# Patient Record
Sex: FEMALE | Race: WHITE | Employment: FULL TIME | URBAN - NONMETROPOLITAN AREA
[De-identification: names, ages, dates, MRNs, and addresses within clinical notes are randomized per-mention and may not be internally consistent; named-entity substitution may affect disease eponyms.]

---

## 2021-04-14 ENCOUNTER — OUTSIDE SERVICES (OUTPATIENT)
Dept: FAMILY MEDICINE CLINIC | Age: 66
End: 2021-04-14

## 2021-04-14 DIAGNOSIS — Z79.4 TYPE 2 DIABETES MELLITUS WITHOUT COMPLICATION, WITH LONG-TERM CURRENT USE OF INSULIN (HCC): ICD-10-CM

## 2021-04-14 DIAGNOSIS — E11.9 TYPE 2 DIABETES MELLITUS WITHOUT COMPLICATION, WITH LONG-TERM CURRENT USE OF INSULIN (HCC): ICD-10-CM

## 2021-04-14 DIAGNOSIS — G37.3 TRANSVERSE MYELITIS (HCC): Primary | ICD-10-CM

## 2021-04-14 DIAGNOSIS — E66.9 OBESITY, UNSPECIFIED CLASSIFICATION, UNSPECIFIED OBESITY TYPE, UNSPECIFIED WHETHER SERIOUS COMORBIDITY PRESENT: ICD-10-CM

## 2021-04-14 NOTE — PROGRESS NOTES
[address] [address]  [phone]  Dept: [phone]    BRANDI HOFF RMA, am scribing for and in the presence of Dr. Radha Gilmore. 4/14/21/12:30pm/SNP    Josafat Frost is a 61 y.o. female being seen for her initial H&P visit. Location of visit: [address] [place] [address].    HPI:  Admission History:  o 4/13/21 Pt admitted from [address] [place] [address] following a hospitalization for myelitis. She was sitting comfortably at home when she felt weakness in her hands and was dropping objects from R hand. Symptoms worsened over 30-45 minutes. she had extensive workup to evaluate for surgical infectious and metabolic etiologies  No causitive agent found   Presumed inflammaory Started on solumedrol 1,000 mg qd x 5 doses for transverse myelitis. She also had plasmaphoresis  Her symptoms seem to be improving slowly  She is admitted for expected prolonged rehab     New today:  N/A    ROS:  General Constitutional: Denies fever. Denies lightheadedness. Respiratory: Denies cough. Denies shortness of breath. Denies wheezing. Cardiovascular: Denies chest pain at rest.  Gastrointestinal: Denies abdominal pain. Denies blood in the stool. Denies constipation. Denies diarrhea. Denies nausea. Denies vomiting. Genitourinary: Denies blood in the urine. Denies painful urination. Skin: Denies rash. Neurologic: Denies falls. Denies dizziness. Xu Horobert Psychiatric: Denies sleep disturbance. Denies anxiety. Denies depressed mood. PHYSICAL EXAM:    General Appearance: in no acute distress, well nourished. Eyes: pupils equal, round reactive to light and accommodation. Oral Cavity: mucosa moist.  Neck/Thyroid:  no cervical lymphadenopathy, no thyromegaly  Skin: cool but not syanotic   Heart: regular rate and rhythm. No murmurs. S1, S2 normal, no gallops. Rate 70  Lungs: clear to auscultation bilaterally. Abdomen:soft, nontender, nondistended, no masses or organomegaly.  Round obese, soft, parikh cath  Extremities: no cyanosis, 1+ edema  Peripheral Pulses: 2+ throughout, symetric. Neurologic: verbally responsive, Can raise both arms, some functional use of R hand L hand non functional, minimal movement in legs, R foot reveals positive babinski sign L foot is neutral  Psych: normal affect, speech fluent. Frustrated and a bit irritable     ASSESSMENT:   Diagnosis Orders   1. Transverse myelitis ([address]     2. Type 2 diabetes mellitus without complication, with long-term current use of insulin (HCC)     3. Obesity, unspecified classification, unspecified obesity type, unspecified whether serious comorbidity present         PLAN:  61 y o dm woman w/ sudden onset of weakness of RUE and rapidly followed by LUE and bilat lower extremities and loss of bowel/bladder function. She was initially hospitalized at [facility] and then transferred to [facility], where she underwent extension neurologic testing to r/u myelopathy and CNS abnormalities or infectious etiology. She was given 5 days of IV steroid and a trial or plasma phoresis. Working dx of transvere myelitis and undetermined etiology. We discuss the uncommon nature and lack of response of usual treatments. She is to f/u with neurology in 2 months    She is here for a rehab stay    I will order a CBC, CMP, A1C, ESR and CRP. I, Dr. Sawyer Mayorga, personally performed the services described in this documentation as scribed by BRANDI Sosa in my presence, and is both accurate and complete.

## 2021-04-16 ENCOUNTER — OUTSIDE SERVICES (OUTPATIENT)
Dept: FAMILY MEDICINE CLINIC | Age: 66
End: 2021-04-16
Payer: COMMERCIAL

## 2021-04-16 DIAGNOSIS — Z79.4 TYPE 2 DIABETES MELLITUS WITHOUT COMPLICATION, WITH LONG-TERM CURRENT USE OF INSULIN (HCC): ICD-10-CM

## 2021-04-16 DIAGNOSIS — E66.09 OBESITY DUE TO EXCESS CALORIES WITH SERIOUS COMORBIDITY, UNSPECIFIED CLASSIFICATION: ICD-10-CM

## 2021-04-16 DIAGNOSIS — G37.3 TRANSVERSE MYELITIS (HCC): Primary | ICD-10-CM

## 2021-04-16 DIAGNOSIS — E11.9 TYPE 2 DIABETES MELLITUS WITHOUT COMPLICATION, WITH LONG-TERM CURRENT USE OF INSULIN (HCC): ICD-10-CM

## 2021-04-16 DIAGNOSIS — E66.9 OBESITY, UNSPECIFIED CLASSIFICATION, UNSPECIFIED OBESITY TYPE, UNSPECIFIED WHETHER SERIOUS COMORBIDITY PRESENT: ICD-10-CM

## 2021-04-16 DIAGNOSIS — M62.838 MUSCLE SPASM: ICD-10-CM

## 2021-04-16 PROCEDURE — 99309 SBSQ NF CARE MODERATE MDM 30: CPT | Performed by: FAMILY MEDICINE

## 2021-04-16 NOTE — PROGRESS NOTES
[address] [facility]  [place] [phone]  Dept: [phone]    BRANDI HOFF, JAELYN, am scribing for and in the presence of Dr. Bentley Zavaleta. 4/16/21/8:45am/SNP    Marbella Thornton is a 61 y.o. female being seen for her   follow up. Location of visit: [address] [place] [address].    HPI:  Admission History:  4/13/21 Pt admitted from [address] [place] [address] following a hospitalization for myelitis. She was sitting comfortably at home when she felt weakness in her hands and was dropping objects from R hand. Symptoms worsened over 30-45 minutes. she had extensive workup to evaluate for surgical infectious and metabolic etiologies  No causitive agent found   Presumed inflammaory Started on solumedrol 1,000 mg qd x 5 doses for transverse myelitis. She also had plasmaphoresis  Her symptoms seem to be improving slowly  She is admitted for expected prolonged rehab     Last visit:  61 y o dm woman w/ sudden onset of weakness of RUE and rapidly followed by LUE and bilat lower extremities and loss of bowel/bladder function. She was initially hospitalized at [address] [place] [address] and then transferred to [facility], where she underwent extension neurologic testing to r/u myelopathy and CNS abnormalities or infectious etiology. She was given 5 days of IV steroid and a trial or plasma phoresis.     Working dx of transvere myelitis and undetermined etiology. We discuss the uncommon nature and lack of response of usual treatments. She is to f/u with neurology in 2 months     She is here for a rehab stay     I will order a CBC, CMP, A1C, ESR and CRP. Faxes since last seen:  N/A    New today:  Nara Reed was upset because she was having a sore buttock and wanted flexeril and IBU but nurse didn't give it to her for several hours. Otherwise, no new problems.  Today we focus on the rarity of tranverse myelitis and once the treatable causes have been addressed rehabilitation is slow, thus the 2 month interval between follow up with neuro. ROS:  General Constitutional: Denies fever. Denies lightheadedness. Respiratory: Denies cough. Denies shortness of breath. Denies wheezing. Cardiovascular: Denies chest pain at rest.  Gastrointestinal: Denies abdominal pain. Denies blood in the stool. Denies constipation. Denies diarrhea. Denies nausea. Denies vomiting. Genitourinary: Denies blood in the urine. Denies painful urination. Skin:  Denies rash. Neurologic: Denies falls. Denies dizziness. Psychiatric: Denies sleep disturbance. Denies anxiety. Denies depressed mood. Admits sore buttock. PHYSICAL EXAM:    General Appearance: in no acute distress, well nourished. Eyes: pupils equal, round reactive to light and accommodation. Oral Cavity: mucosa moist.  Neck/Thyroid:  no cervical lymphadenopathy, no thyromegaly  Skin: warm and dry  Heart: regular rate and rhythm. No murmurs. S1, S2 normal, no gallops. Lungs: clear to auscultation bilaterally. Abdomen:soft, nontender, nondistended, no masses or organomegaly. Obese. Extremities: no cyanosis or edema. R hand can grasp more than left, feet minimal movement. : fol  Peripheral Pulses: 2+ throughout, symetric. Neurologic: verbally responsive, moves extremities. Psych: normal affect, speech fluent. ASSESSMENT:   Diagnosis Orders   1. Transverse myelitis ([facility]     2. Type 2 diabetes mellitus without complication, with long-term current use of insulin (HCC)     3. Obesity, unspecified classification, unspecified obesity type, unspecified whether serious comorbidity present     4. Obesity due to excess calories with serious comorbidity, unspecified classification     5. Muscle spasm         PLAN:  I will not make any changes, today. I, Dr. Anamaria Barrientos, personally performed the services described in this documentation as scribed by BRANDI Olvera in my presence, and is both accurate and complete.

## 2021-04-19 ENCOUNTER — OUTSIDE SERVICES (OUTPATIENT)
Dept: FAMILY MEDICINE CLINIC | Age: 66
End: 2021-04-19
Payer: COMMERCIAL

## 2021-04-19 DIAGNOSIS — L89.42 PRESSURE INJURY OF CONTIGUOUS REGION INVOLVING BUTTOCK AND HIP, STAGE 2, UNSPECIFIED LATERALITY (HCC): ICD-10-CM

## 2021-04-19 DIAGNOSIS — E66.9 OBESITY, UNSPECIFIED CLASSIFICATION, UNSPECIFIED OBESITY TYPE, UNSPECIFIED WHETHER SERIOUS COMORBIDITY PRESENT: ICD-10-CM

## 2021-04-19 DIAGNOSIS — G37.3 TRANSVERSE MYELITIS (HCC): Primary | ICD-10-CM

## 2021-04-19 DIAGNOSIS — M62.838 MUSCLE SPASM: ICD-10-CM

## 2021-04-19 DIAGNOSIS — Z79.4 TYPE 2 DIABETES MELLITUS WITHOUT COMPLICATION, WITH LONG-TERM CURRENT USE OF INSULIN (HCC): ICD-10-CM

## 2021-04-19 DIAGNOSIS — E11.9 TYPE 2 DIABETES MELLITUS WITHOUT COMPLICATION, WITH LONG-TERM CURRENT USE OF INSULIN (HCC): ICD-10-CM

## 2021-04-19 DIAGNOSIS — E66.09 OBESITY DUE TO EXCESS CALORIES WITH SERIOUS COMORBIDITY, UNSPECIFIED CLASSIFICATION: ICD-10-CM

## 2021-04-19 PROCEDURE — 99309 SBSQ NF CARE MODERATE MDM 30: CPT | Performed by: FAMILY MEDICINE

## 2021-04-19 NOTE — PROGRESS NOTES
[address] [address]  [place] [phone]  Dept: [phone]    Pavel HOFF RMA, am scribing for and in the presence of Dr. Frandy Cullen. 04/19/2021 4:34 pm ROMIE Mccarty is a 61 y.o. female being seen for her weekly follow up. Location of visit: [address] [place] [address].    HPI:  Admission History:  4/13/21 Pt admitted from [address] [place] [address] following a hospitalization for myelitis. She was sitting comfortably at home when she felt weakness in her hands and was dropping objects from R hand. Symptoms worsened over 30-45 minutes. she had extensive workup to evaluate for surgical infectious and metabolic etiologies  No causitive agent found   Presumed inflammaory Started on solumedrol 1,000 mg qd x 5 doses for transverse myelitis. She also had plasmaphoresis  Her symptoms seem to be improving slowly  She is admitted for expected prolonged rehab     Last visit:  I will not make any changes, today. Faxes since last seen:  o 4/16/21 Labs revd, no changes made. o 4/16/21 c/o constipation, start Miralax I cap qd     New today:  Open sores on buttocks    ROS:  General Constitutional: Denies fever. Denies lightheadedness. Respiratory: Denies cough. Denies shortness of breath. Denies wheezing. Cardiovascular: Denies chest pain at rest.  Gastrointestinal: Denies abdominal pain. Denies blood in the stool. Denies constipation. Denies diarrhea. Denies nausea. Denies vomiting. Genitourinary: Denies blood in the urine. Denies painful urination. Skin:  Denies rash. Neurologic: Denies falls. Denies dizziness. Psychiatric: Denies sleep disturbance. Denies anxiety. Denies depressed mood. PHYSICAL EXAM:    General Appearance: in no acute distress, well nourished. Eyes: pupils equal, round reactive to light and accommodation.   Oral Cavity: mucosa moist.  Neck/Thyroid:  no cervical lymphadenopathy, no thyromegaly  Skin: warm and dry, erythema along gluteal cleft and medial buttock of both thighs and ischial region on R side, partial thickness, no satellite lesions, scalded skin appearance  Heart: regular rate and rhythm. No murmurs. S1, S2 normal, no gallops. Rate 70  Lungs: clear to auscultation bilaterally. Abdomen:soft, nontender, nondistended, no masses or organomegaly. obese  Extremities: no cyanosis or edema. Peripheral Pulses: 2+ throughout, symetric. Neurologic: verbally responsive, moves extremities. Psych: normal affect, speech fluent. ASSESSMENT:   Diagnosis Orders   1. Transverse myelitis ([facility] [address]     2. Type 2 diabetes mellitus without complication, with long-term current use of insulin (HCC)     3. Obesity, unspecified classification, unspecified obesity type, unspecified whether serious comorbidity present     4. Obesity due to excess calories with serious comorbidity, unspecified classification     5. Muscle spasm         PLAN:  She has developed open sores on buttocks. She denies any associated pain. She has a sensation of BM or gas, she had a suppository for a BM 1 day ago but no sensation of defecation. Lehman cath in place. I will give orders for westcort cream to be applied to center chest bid for rash. I will also give order for wound consult for buttock wounds and side to side positioning. I, Dr. Ansley Henderson, personally performed the services described in this documentation as scribed by JAELYN De La Vega in my presence, and is both accurate and complete.

## 2021-04-23 ENCOUNTER — OUTSIDE SERVICES (OUTPATIENT)
Dept: FAMILY MEDICINE CLINIC | Age: 66
End: 2021-04-23
Payer: COMMERCIAL

## 2021-04-23 DIAGNOSIS — Z79.4 TYPE 2 DIABETES MELLITUS WITHOUT COMPLICATION, WITH LONG-TERM CURRENT USE OF INSULIN (HCC): ICD-10-CM

## 2021-04-23 DIAGNOSIS — E66.9 OBESITY, UNSPECIFIED CLASSIFICATION, UNSPECIFIED OBESITY TYPE, UNSPECIFIED WHETHER SERIOUS COMORBIDITY PRESENT: ICD-10-CM

## 2021-04-23 DIAGNOSIS — G37.3 TRANSVERSE MYELITIS (HCC): Primary | ICD-10-CM

## 2021-04-23 DIAGNOSIS — M62.838 MUSCLE SPASM: ICD-10-CM

## 2021-04-23 DIAGNOSIS — E11.9 TYPE 2 DIABETES MELLITUS WITHOUT COMPLICATION, WITH LONG-TERM CURRENT USE OF INSULIN (HCC): ICD-10-CM

## 2021-04-23 DIAGNOSIS — E66.09 OBESITY DUE TO EXCESS CALORIES WITH SERIOUS COMORBIDITY, UNSPECIFIED CLASSIFICATION: ICD-10-CM

## 2021-04-23 DIAGNOSIS — G82.50: ICD-10-CM

## 2021-04-23 DIAGNOSIS — L89.302 PRESSURE INJURY OF BUTTOCK, STAGE 2, UNSPECIFIED LATERALITY (HCC): ICD-10-CM

## 2021-04-23 PROCEDURE — 99309 SBSQ NF CARE MODERATE MDM 30: CPT | Performed by: FAMILY MEDICINE

## 2021-04-23 NOTE — PROGRESS NOTES
[address] [address]  [place] [address] [phone]  Dept: [phone]    BRANDI HOFF Counter, A, am scribing for and in the presence of Dr. Nancy Mills. 4/23/21/8:55am/SNP    Sarajane Prader is a 61 y.o. female being seen for her   follow up. Location of visit: [address] [place] [address].    HPI:  Admission History:  4/13/21 Pt admitted from [address] [place] [address] following a hospitalization for myelitis. She was sitting comfortably at home when she felt weakness in her hands and was dropping objects from R hand. Symptoms worsened over 30-45 minutes. she had extensive workup to evaluate for surgical infectious and metabolic etiologies  No causitive agent found   Presumed inflammaory Started on solumedrol 1,000 mg qd x 5 doses for transverse myelitis. She also had plasmaphoresis  Her symptoms seem to be improving slowly  She is admitted for expected prolonged rehab      Last visit:  She has developed open sores on buttocks. She denies any associated pain. She has a sensation of BM or gas, she had a suppository for a BM 1 day ago but no sensation of defecation. Lehman cath in place.      I will give orders for westcort cream to be applied to center chest bid for rash. I will also give order for wound consult for buttock wounds and side to side positioning.      Faxes since last seen:  N/A    New today:    ROS:  General Constitutional: Denies fever. Denies lightheadedness. Respiratory: Denies cough. Denies shortness of breath. Denies wheezing. Cardiovascular: Denies chest pain at rest.  Gastrointestinal: Denies abdominal pain. Denies blood in the stool. Denies constipation. Denies diarrhea. Denies nausea. Denies vomiting. Genitourinary: Denies blood in the urine. Denies painful urination. Skin:  Denies rash. Neurologic: Denies falls. Denies dizziness. Psychiatric: Denies sleep disturbance. Denies anxiety. Denies depressed mood.     PHYSICAL EXAM:    General Appearance: in no acute

## 2021-04-26 ENCOUNTER — OUTSIDE SERVICES (OUTPATIENT)
Dept: FAMILY MEDICINE CLINIC | Age: 66
End: 2021-04-26
Payer: COMMERCIAL

## 2021-04-26 DIAGNOSIS — E66.9 OBESITY, UNSPECIFIED CLASSIFICATION, UNSPECIFIED OBESITY TYPE, UNSPECIFIED WHETHER SERIOUS COMORBIDITY PRESENT: ICD-10-CM

## 2021-04-26 DIAGNOSIS — L89.42 PRESSURE INJURY OF CONTIGUOUS REGION INVOLVING BUTTOCK AND HIP, STAGE 2, UNSPECIFIED LATERALITY (HCC): ICD-10-CM

## 2021-04-26 DIAGNOSIS — L89.93 PRESSURE INJURY, STAGE 3, UNSPECIFIED LOCATION (HCC): ICD-10-CM

## 2021-04-26 DIAGNOSIS — E66.09 OBESITY DUE TO EXCESS CALORIES WITH SERIOUS COMORBIDITY, UNSPECIFIED CLASSIFICATION: ICD-10-CM

## 2021-04-26 DIAGNOSIS — G37.3 TRANSVERSE MYELITIS (HCC): Primary | ICD-10-CM

## 2021-04-26 DIAGNOSIS — E11.9 TYPE 2 DIABETES MELLITUS WITHOUT COMPLICATION, WITH LONG-TERM CURRENT USE OF INSULIN (HCC): ICD-10-CM

## 2021-04-26 DIAGNOSIS — M62.838 MUSCLE SPASM: ICD-10-CM

## 2021-04-26 DIAGNOSIS — Z79.4 TYPE 2 DIABETES MELLITUS WITHOUT COMPLICATION, WITH LONG-TERM CURRENT USE OF INSULIN (HCC): ICD-10-CM

## 2021-04-26 PROCEDURE — 99309 SBSQ NF CARE MODERATE MDM 30: CPT | Performed by: FAMILY MEDICINE

## 2021-04-26 NOTE — PROGRESS NOTES
[address] [address]  [place] [address]  Dept: [phone]    Lou HOFF RMA, am scribing for and in the presence of Dr. Foreign Torres. 04/26/2021 10:27 am ROMIE Mckinney is a 61 y.o. female being seen for her weekly follow up. Location of visit: [address] [place] [address].    HPI:  Admission History:  4/13/21 Pt admitted from [facility] following a hospitalization for myelitis. She was sitting comfortably at home when she felt weakness in her hands and was dropping objects from R hand. Symptoms worsened over 30-45 minutes. she had extensive workup to evaluate for surgical infectious and metabolic etiologies  No causitive agent found   Presumed inflammaory Started on solumedrol 1,000 mg qd x 5 doses for transverse myelitis. She also had plasmaphoresis  Her symptoms seem to be improving slowly  She is admitted for expected prolonged rehab     Last visit:  She has developed decubitus skin break down which is being evaluated by wound care, topical applications are being made. She is seeing movement in L hand, more than she had.     Turn her on her hip at 90 degrees 5 minutes hourly alternating, rest of hour 45 degrees with pillow support R or L her desire. Labs order, CBC, CMP and prealbumin and start her on zinc gluconate. Faxes since last seen:  o 4/23/21 Zinc started, change to 220 mg po qd. New today:  Orders written for positioning were not adhered to the duration of the weekend due to staffing. She is getting more UE mobility and functionality. She has no cardiac or resp. Sx. She has a BM with dulcolax supp. ROS:  General Constitutional: Denies fever. Denies lightheadedness. Respiratory: Denies cough. Denies shortness of breath. Denies wheezing. Cardiovascular: Denies chest pain at rest.  Gastrointestinal: Denies abdominal pain. Denies blood in the stool. Denies constipation. Denies diarrhea. Denies nausea. Denies vomiting. Genitourinary: Denies blood in the urine. Denies painful urination. Skin:  Denies rash. Neurologic: Denies falls. Denies dizziness. Psychiatric: Denies sleep disturbance. Denies anxiety. Denies depressed mood. PHYSICAL EXAM:    General Appearance: in no acute distress, well nourished. Eyes: pupils equal, round reactive to light and accommodation. Oral Cavity: mucosa moist.  Neck/Thyroid:  no cervical lymphadenopathy, no thyromegaly  Skin: warm and dry stage II ulceration on buttocks improved, less erythema, stage III ulceration at sacrum 3 cm x 2 cm appears filled with eschar and debris. Heart: regular rate and rhythm. No murmurs. S1, S2 normal, no gallops. Lungs: clear to auscultation bilaterally. Abdomen:soft, nontender, nondistended, no masses or organomegaly. Extremities: no cyanosis or edema. Peripheral Pulses: 2+ throughout, symetric. Neurologic: verbally responsive, moves extremities. Psych: normal affect, speech fluent. ASSESSMENT:   Diagnosis Orders   1. Transverse myelitis ([address]     2. Type 2 diabetes mellitus without complication, with long-term current use of insulin ([facility])     3. Obesity, unspecified classification, unspecified obesity type, unspecified whether serious comorbidity present     4. Obesity due to excess calories with serious comorbidity, unspecified classification     5. Muscle spasm     6. Pressure injury, stage 3, unspecified location ([address]     7. Pressure injury of contiguous region involving buttock and hip, stage 2, unspecified laterality ([address]         PLAN:  I reiterate that she needs to assist with positioning to take pressure off of the sacral region. Wound specialist will see her today. I will give orders for Dulcolax suppository 1 pr qd, as this may be needed daily to stimulate stool movement. I will also change heparin to 5000 units SC q12h.      I, Dr. Lisa Leon, personally performed the services described in this documentation as scribed by Chaka Figueroa

## 2021-04-28 ENCOUNTER — OUTSIDE SERVICES (OUTPATIENT)
Dept: FAMILY MEDICINE CLINIC | Age: 66
End: 2021-04-28
Payer: COMMERCIAL

## 2021-04-28 DIAGNOSIS — L89.302 PRESSURE INJURY OF BUTTOCK, STAGE 2, UNSPECIFIED LATERALITY (HCC): ICD-10-CM

## 2021-04-28 DIAGNOSIS — M62.838 MUSCLE SPASM: ICD-10-CM

## 2021-04-28 DIAGNOSIS — Z79.4 TYPE 2 DIABETES MELLITUS WITHOUT COMPLICATION, WITH LONG-TERM CURRENT USE OF INSULIN (HCC): ICD-10-CM

## 2021-04-28 DIAGNOSIS — G37.3 TRANSVERSE MYELITIS (HCC): Primary | ICD-10-CM

## 2021-04-28 DIAGNOSIS — E66.9 OBESITY, UNSPECIFIED CLASSIFICATION, UNSPECIFIED OBESITY TYPE, UNSPECIFIED WHETHER SERIOUS COMORBIDITY PRESENT: ICD-10-CM

## 2021-04-28 DIAGNOSIS — E66.09 OBESITY DUE TO EXCESS CALORIES WITH SERIOUS COMORBIDITY, UNSPECIFIED CLASSIFICATION: ICD-10-CM

## 2021-04-28 DIAGNOSIS — E11.9 TYPE 2 DIABETES MELLITUS WITHOUT COMPLICATION, WITH LONG-TERM CURRENT USE OF INSULIN (HCC): ICD-10-CM

## 2021-04-28 DIAGNOSIS — L89.42 PRESSURE INJURY OF CONTIGUOUS REGION INVOLVING BUTTOCK AND HIP, STAGE 2, UNSPECIFIED LATERALITY (HCC): ICD-10-CM

## 2021-04-28 DIAGNOSIS — L89.93 PRESSURE INJURY, STAGE 3, UNSPECIFIED LOCATION (HCC): ICD-10-CM

## 2021-04-28 PROCEDURE — 99309 SBSQ NF CARE MODERATE MDM 30: CPT | Performed by: FAMILY MEDICINE

## 2021-04-28 NOTE — PROGRESS NOTES
[address] [address]  [place] [address]  Dept: [phone]    BRANDI HOFF RMA, am scribing for and in the presence of Dr. Xiong Seen. 4/28/21/9:58am/SNP    Lissy Cade is a 61 y.o. female being seen for her   follow up. Location of visit: [address] [place] [address].    HPI:  Admission History:  4/13/21 Pt admitted from [address] [place] [address] following a hospitalization for myelitis. She was sitting comfortably at home when she felt weakness in her hands and was dropping objects from R hand. Symptoms worsened over 30-45 minutes. she had extensive workup to evaluate for surgical infectious and metabolic etiologies  No causitive agent found   Presumed inflammaory Started on solumedrol 1,000 mg qd x 5 doses for transverse myelitis. She also had plasmaphoresis  Her symptoms seem to be improving slowly  She is admitted for expected prolonged rehab     Last visit:  I reiterate that she needs to assist with positioning to take pressure off of the sacral region. Wound specialist will see her today. I will give orders for Dulcolax suppository 1 pr qd, as this may be needed daily to stimulate stool movement. I will also change heparin to 5000 units SC q12h. Faxes since last seen:  o 4/26/21 R will remain at the facility and off of work indefinitely. o 4/27/21 Labs revd, no changes made. New today:  Shanell is getting some significant improvement with her UE/LE strength and movement. She has had difficulty with compliance with bed positioning due to staffing issues. She did have debridement by wound dr. Manuel Pugh. Appetite is good BS are well controlled. She is not using SS coverage. ROS:  General Constitutional: Denies fever. Denies lightheadedness. Respiratory: Denies cough. Denies shortness of breath. Denies wheezing. Cardiovascular: Denies chest pain at rest.  Gastrointestinal: Denies abdominal pain. Denies blood in the stool. Denies constipation. Denies diarrhea. Denies nausea. Denies vomiting. Genitourinary: Denies blood in the urine. Denies painful urination. Skin:  Denies rash. Neurologic: Denies falls. Denies dizziness. Psychiatric: Denies sleep disturbance. Denies anxiety. Denies depressed mood. PHYSICAL EXAM:    General Appearance: in no acute distress, well nourished. Able to move feet and raise legs again gravity and assist with bed positioning. Eyes: pupils equal, round reactive to light and accommodation. Oral Cavity: mucosa moist.  Neck/Thyroid:  no cervical lymphadenopathy, no thyromegaly  Skin: warm sacral decub. Ulcer, rather deep with dark center. She has another smaller ulceration full thickness at coccyx. Heart: regular rate and rhythm. No murmurs. S1, S2 normal, no gallops. Lungs: clear to auscultation bilaterally. Abdomen:soft, nontender, nondistended, no masses or organomegaly. Obese. Extremities: no cyanosis or edema. Peripheral Pulses: 2+ throughout, symetric. Neurologic: verbally responsive, moves extremities. Psych: normal affect, speech fluent. ASSESSMENT:   Diagnosis Orders   1. Transverse myelitis ([address]     2. Type 2 diabetes mellitus without complication, with long-term current use of insulin ([facility])     3. Obesity, unspecified classification, unspecified obesity type, unspecified whether serious comorbidity present     4. Obesity due to excess calories with serious comorbidity, unspecified classification     5. Muscle spasm     6. Pressure injury, stage 3, unspecified location ([address]     7. Pressure injury of contiguous region involving buttock and hip, stage 2, unspecified laterality ([address]     8. Pressure injury of buttock, stage 2, unspecified laterality ([address]         PLAN:  I will d/c fsbs sliding scale humalog. I also would like fsbs qam only  I would like them to be more diligent with bed positioning to keep pressure off of her ulcers.      I, Dr. Norm Christianson, personally performed the services described in this documentation as scribed by BRANDI Machado Hence in my presence, and is both accurate and complete.

## 2021-04-30 ENCOUNTER — OUTSIDE SERVICES (OUTPATIENT)
Dept: FAMILY MEDICINE CLINIC | Age: 66
End: 2021-04-30
Payer: COMMERCIAL

## 2021-04-30 DIAGNOSIS — E66.9 OBESITY, UNSPECIFIED CLASSIFICATION, UNSPECIFIED OBESITY TYPE, UNSPECIFIED WHETHER SERIOUS COMORBIDITY PRESENT: ICD-10-CM

## 2021-04-30 DIAGNOSIS — L89.42 PRESSURE INJURY OF CONTIGUOUS REGION INVOLVING BUTTOCK AND HIP, STAGE 2, UNSPECIFIED LATERALITY (HCC): ICD-10-CM

## 2021-04-30 DIAGNOSIS — G37.3 TRANSVERSE MYELITIS (HCC): Primary | ICD-10-CM

## 2021-04-30 DIAGNOSIS — L89.93 PRESSURE INJURY, STAGE 3, UNSPECIFIED LOCATION (HCC): ICD-10-CM

## 2021-04-30 DIAGNOSIS — E66.09 OBESITY DUE TO EXCESS CALORIES WITH SERIOUS COMORBIDITY, UNSPECIFIED CLASSIFICATION: ICD-10-CM

## 2021-04-30 DIAGNOSIS — L89.302 PRESSURE INJURY OF BUTTOCK, STAGE 2, UNSPECIFIED LATERALITY (HCC): ICD-10-CM

## 2021-04-30 DIAGNOSIS — E11.9 TYPE 2 DIABETES MELLITUS WITHOUT COMPLICATION, WITH LONG-TERM CURRENT USE OF INSULIN (HCC): ICD-10-CM

## 2021-04-30 DIAGNOSIS — M62.838 MUSCLE SPASM: ICD-10-CM

## 2021-04-30 DIAGNOSIS — Z79.4 TYPE 2 DIABETES MELLITUS WITHOUT COMPLICATION, WITH LONG-TERM CURRENT USE OF INSULIN (HCC): ICD-10-CM

## 2021-04-30 PROCEDURE — 99309 SBSQ NF CARE MODERATE MDM 30: CPT | Performed by: FAMILY MEDICINE

## 2021-04-30 NOTE — PROGRESS NOTES
[address] [address]  [place] [phone]  Dept: [phone]    IMatilda RMA, am scribing for and in the presence of Dr. Abimael Suarez. 04/30/2021 12:12 pm ROMIE Mccollum is a 61 y.o. female being seen for her skilled follow up. Location of visit: [address] [place] [address].    HPI:  Admission History:  4/13/21 Pt admitted from [address] [place] [address] following a hospitalization for myelitis. She was sitting comfortably at home when she felt weakness in her hands and was dropping objects from R hand. Symptoms worsened over 30-45 minutes. she had extensive workup to evaluate for surgical infectious and metabolic etiologies  No causitive agent found   Presumed inflammaory Started on solumedrol 1,000 mg qd x 5 doses for transverse myelitis. She also had plasmaphoresis  Her symptoms seem to be improving slowly  She is admitted for expected prolonged rehab     Last visit:  I will d/c fsbs sliding scale humalog. I also would like fsbs qam only  I would like them to be more diligent with bed positioning to keep pressure off of her ulcers. Faxes since last seen:  -none. New today:  Shanell is getting much better mobility of arms and legs, PT attempted to stand her at parallel bars, unable to accomplish, bed mobility improving, assist with rolling over. Buttock lesions improving with offloading instructions. Not waking at night as often. Regular BM yesterday but still can't sense passage. ROS:  General Constitutional: Denies fever. Denies lightheadedness. Respiratory: Denies cough. Denies shortness of breath. Denies wheezing. Cardiovascular: Denies chest pain at rest.  Gastrointestinal: Denies abdominal pain. Denies blood in the stool. Denies constipation. Denies diarrhea. Denies nausea. Denies vomiting. Genitourinary: Denies blood in the urine. Denies painful urination. Skin:  Denies rash. Neurologic: Denies falls. Denies dizziness.   Psychiatric: Denies

## 2021-05-03 ENCOUNTER — OUTSIDE SERVICES (OUTPATIENT)
Dept: FAMILY MEDICINE CLINIC | Age: 66
End: 2021-05-03
Payer: COMMERCIAL

## 2021-05-03 DIAGNOSIS — E66.09 OBESITY DUE TO EXCESS CALORIES WITH SERIOUS COMORBIDITY, UNSPECIFIED CLASSIFICATION: ICD-10-CM

## 2021-05-03 DIAGNOSIS — L89.302 PRESSURE INJURY OF BUTTOCK, STAGE 2, UNSPECIFIED LATERALITY (HCC): ICD-10-CM

## 2021-05-03 DIAGNOSIS — L89.42 PRESSURE INJURY OF CONTIGUOUS REGION INVOLVING BUTTOCK AND HIP, STAGE 2, UNSPECIFIED LATERALITY (HCC): ICD-10-CM

## 2021-05-03 DIAGNOSIS — L89.93 PRESSURE INJURY, STAGE 3, UNSPECIFIED LOCATION (HCC): ICD-10-CM

## 2021-05-03 DIAGNOSIS — E66.9 OBESITY, UNSPECIFIED CLASSIFICATION, UNSPECIFIED OBESITY TYPE, UNSPECIFIED WHETHER SERIOUS COMORBIDITY PRESENT: ICD-10-CM

## 2021-05-03 DIAGNOSIS — Z79.4 TYPE 2 DIABETES MELLITUS WITHOUT COMPLICATION, WITH LONG-TERM CURRENT USE OF INSULIN (HCC): ICD-10-CM

## 2021-05-03 DIAGNOSIS — E11.9 TYPE 2 DIABETES MELLITUS WITHOUT COMPLICATION, WITH LONG-TERM CURRENT USE OF INSULIN (HCC): ICD-10-CM

## 2021-05-03 DIAGNOSIS — M62.838 MUSCLE SPASM: ICD-10-CM

## 2021-05-03 DIAGNOSIS — G37.3 TRANSVERSE MYELITIS (HCC): Primary | ICD-10-CM

## 2021-05-03 PROCEDURE — 99309 SBSQ NF CARE MODERATE MDM 30: CPT | Performed by: FAMILY MEDICINE

## 2021-05-03 NOTE — PROGRESS NOTES
[address] [address]  [place] [phone]  Dept: [phone]    BRANDI HOFF RMA, am scribing for and in the presence of Dr. Ana Cabello. 5/3/21/8:52am/SNP    Martín Navarro is a 61 y.o. female being seen for her skilled follow up. Location of visit: [address] [place] [address].    HPI:  Admission History:  4/13/21 Pt admitted from [address] [place] [address] following a hospitalization for myelitis. She was sitting comfortably at home when she felt weakness in her hands and was dropping objects from R hand. Symptoms worsened over 30-45 minutes. she had extensive workup to evaluate for surgical infectious and metabolic etiologies  No causitive agent found   Presumed inflammaory Started on solumedrol 1,000 mg qd x 5 doses for transverse myelitis. She also had plasmaphoresis  Her symptoms seem to be improving slowly  She is admitted for expected prolonged rehab      Last visit:  I would like her to position herself on her side at night. I will not make any changes, today. Faxes since last seen:  N/A    New today:  Bed mobility continues to improve, limited manual dexterity,unable to operate bed controls, can assist with rolling over. Bowels are fine. She has had worsening of sacral decubitus with foul odor with the ulcer. ROS:  General Constitutional: Denies fever. Denies lightheadedness. Respiratory: Denies cough. Denies shortness of breath. Denies wheezing. Cardiovascular: Denies chest pain at rest.  Gastrointestinal: Denies abdominal pain. Denies blood in the stool. Denies constipation. Denies diarrhea. Denies nausea. Denies vomiting. Genitourinary: Denies blood in the urine. Denies painful urination. Skin:  Denies rash. Neurologic: Denies falls. Denies dizziness. Psychiatric: Denies sleep disturbance. Denies anxiety. Denies depressed mood. PHYSICAL EXAM:    General Appearance: in no acute distress, well nourished.   Eyes: pupils equal, round reactive to light and accommodation. Oral Cavity: mucosa moist.  Neck/Thyroid:  no cervical lymphadenopathy, no thyromegaly  Skin: warm and dry buttock area shows enlargement of sacral dec ulcer, combine into one sacral ulcer with dark eschar, buttocks stage II ulcerations nearly healed. No other breakdown. Heart: regular rate and rhythm. No murmurs. S1, S2 normal, no gallops. Lungs: clear to auscultation bilaterally. Abdomen:soft, nontender, nondistended, no masses or organomegaly. Extremities: no cyanosis or edema. Peripheral Pulses: 2+ throughout, symetric. Neurologic: verbally responsive, moves extremities. Psych: normal affect, speech fluent. ASSESSMENT:   Diagnosis Orders   1. Transverse myelitis ([address]     2. Type 2 diabetes mellitus without complication, with long-term current use of insulin ([facility])     3. Obesity, unspecified classification, unspecified obesity type, unspecified whether serious comorbidity present     4. Obesity due to excess calories with serious comorbidity, unspecified classification     5. Muscle spasm     6. Pressure injury, stage 3, unspecified location ([address]     7. Pressure injury of contiguous region involving buttock and hip, stage 2, unspecified laterality ([address]     8. Pressure injury of buttock, stage 2, unspecified laterality ([address]       PLAN:  Systemic abx are not indicated for ulcers like this. Anaerobic bacteria is suspected due to the odor. I will give orders for betadine wet-dry on sacral/ coccygeal ulcer bid. I, Dr. Leslie Harrison, personally performed the services described in this documentation as scribed by BRANDI Del Castillo in my presence, and is both accurate and complete.

## 2021-05-05 ENCOUNTER — OUTSIDE SERVICES (OUTPATIENT)
Dept: FAMILY MEDICINE CLINIC | Age: 66
End: 2021-05-05

## 2021-05-05 DIAGNOSIS — L89.302 PRESSURE INJURY OF BUTTOCK, STAGE 2, UNSPECIFIED LATERALITY (HCC): ICD-10-CM

## 2021-05-05 DIAGNOSIS — L89.42 PRESSURE INJURY OF CONTIGUOUS REGION INVOLVING BUTTOCK AND HIP, STAGE 2, UNSPECIFIED LATERALITY (HCC): ICD-10-CM

## 2021-05-05 DIAGNOSIS — L89.93 PRESSURE INJURY, STAGE 3, UNSPECIFIED LOCATION (HCC): ICD-10-CM

## 2021-05-05 DIAGNOSIS — G37.3 TRANSVERSE MYELITIS (HCC): Primary | ICD-10-CM

## 2021-05-05 DIAGNOSIS — E66.9 OBESITY, UNSPECIFIED CLASSIFICATION, UNSPECIFIED OBESITY TYPE, UNSPECIFIED WHETHER SERIOUS COMORBIDITY PRESENT: ICD-10-CM

## 2021-05-05 DIAGNOSIS — M62.838 MUSCLE SPASM: ICD-10-CM

## 2021-05-05 DIAGNOSIS — Z79.4 TYPE 2 DIABETES MELLITUS WITHOUT COMPLICATION, WITH LONG-TERM CURRENT USE OF INSULIN (HCC): ICD-10-CM

## 2021-05-05 DIAGNOSIS — E11.9 TYPE 2 DIABETES MELLITUS WITHOUT COMPLICATION, WITH LONG-TERM CURRENT USE OF INSULIN (HCC): ICD-10-CM

## 2021-05-05 DIAGNOSIS — E66.09 OBESITY DUE TO EXCESS CALORIES WITH SERIOUS COMORBIDITY, UNSPECIFIED CLASSIFICATION: ICD-10-CM

## 2021-05-05 NOTE — PROGRESS NOTES
[address] [address]  [phone]  Dept: [phone]    BRANDI HOFF RMA, am scribing for and in the presence of Dr. Ben Wells. 5/5/21/10:55/SNP    Santy Mccollum is a 61 y.o. female being seen for her skilled follow up. Location of visit: [address] [place] [address].    HPI:  Admission History:  4/13/21 Pt admitted from [address] [place] [address] following a hospitalization for myelitis. She was sitting comfortably at home when she felt weakness in her hands and was dropping objects from R hand. Symptoms worsened over 30-45 minutes. she had extensive workup to evaluate for surgical infectious and metabolic etiologies  No causitive agent found   Presumed inflammaory Started on solumedrol 1,000 mg qd x 5 doses for transverse myelitis. She also had plasmaphoresis  Her symptoms seem to be improving slowly  She is admitted for expected prolonged rehab     Last visit:  Systemic abx are not indicated for ulcers like this. Anaerobic bacteria is suspected due to the odor. I will give orders for betadine wet-dry on sacral/ coccygeal ulcer bid. Faxes since last seen:  o 5/3/21 ordered Tylenol 325mg 2 po q 4* prn  o 5/4/21 [employer] disability paperwork faxed to facility   New today:  Silvia Gillespie was seen by the surgeon yesterday. Wound will require more aggressive treatment, sending her to wound clinic since on heparin. Bed mobility improving trouble with dexterity and holding objects. Not able to stand but able to sit in chair. BMs regular    ROS:  General Constitutional: Denies fever. Denies lightheadedness. Respiratory: Denies cough. Denies shortness of breath. Denies wheezing. Cardiovascular: Denies chest pain at rest.  Gastrointestinal: Denies abdominal pain. Denies blood in the stool. Denies constipation. Denies diarrhea. Denies nausea. Denies vomiting. Genitourinary: Denies blood in the urine. Denies painful urination. Skin:  Denies rash. Neurologic: Denies falls.  Denies dizziness. Psychiatric: Denies sleep disturbance. Denies anxiety. Denies depressed mood. PHYSICAL EXAM:    General Appearance: in no acute distress, well nourished. Eyes: pupils equal, round reactive to light and accommodation. Oral Cavity: mucosa moist.  Neck/Thyroid:  no cervical lymphadenopathy, no thyromegaly  Skin: warm and dry wound not examined, today. Heart: regular rate and rhythm. No murmurs. S1, S2 normal, no gallops. Lungs: clear to auscultation bilaterally. Abdomen:soft, nontender, nondistended, no masses or organomegaly. Extremities: no cyanosis or edema. Peripheral Pulses: 2+ throughout, symetric. Neurologic: verbally responsive, moves extremities. Psych: normal affect, speech fluent. ASSESSMENT:   Diagnosis Orders   1. Transverse myelitis ([address]     2. Type 2 diabetes mellitus without complication, with long-term current use of insulin ([facility])     3. Obesity, unspecified classification, unspecified obesity type, unspecified whether serious comorbidity present     4. Obesity due to excess calories with serious comorbidity, unspecified classification     5. Muscle spasm     6. Pressure injury, stage 3, unspecified location ([address]     7. Pressure injury of contiguous region involving buttock and hip, stage 2, unspecified laterality ([address]     8. Pressure injury of buttock, stage 2, unspecified laterality ([address]         PLAN:  I will not make any changes, today. I, Dr. Mauro White, personally performed the services described in this documentation as scribed by JAELYN Adams in my presence, and is both accurate and complete.

## 2021-05-07 ENCOUNTER — OUTSIDE SERVICES (OUTPATIENT)
Dept: FAMILY MEDICINE CLINIC | Age: 66
End: 2021-05-07

## 2021-05-07 DIAGNOSIS — Z79.4 TYPE 2 DIABETES MELLITUS WITHOUT COMPLICATION, WITH LONG-TERM CURRENT USE OF INSULIN (HCC): ICD-10-CM

## 2021-05-07 DIAGNOSIS — L89.302 PRESSURE INJURY OF BUTTOCK, STAGE 2, UNSPECIFIED LATERALITY (HCC): ICD-10-CM

## 2021-05-07 DIAGNOSIS — E66.09 OBESITY DUE TO EXCESS CALORIES WITH SERIOUS COMORBIDITY, UNSPECIFIED CLASSIFICATION: ICD-10-CM

## 2021-05-07 DIAGNOSIS — G37.3 TRANSVERSE MYELITIS (HCC): Primary | ICD-10-CM

## 2021-05-07 DIAGNOSIS — L89.42 PRESSURE INJURY OF CONTIGUOUS REGION INVOLVING BUTTOCK AND HIP, STAGE 2, UNSPECIFIED LATERALITY (HCC): ICD-10-CM

## 2021-05-07 DIAGNOSIS — M62.838 MUSCLE SPASM: ICD-10-CM

## 2021-05-07 DIAGNOSIS — E11.9 TYPE 2 DIABETES MELLITUS WITHOUT COMPLICATION, WITH LONG-TERM CURRENT USE OF INSULIN (HCC): ICD-10-CM

## 2021-05-07 DIAGNOSIS — L89.93 PRESSURE INJURY, STAGE 3, UNSPECIFIED LOCATION (HCC): ICD-10-CM

## 2021-05-07 DIAGNOSIS — E66.9 OBESITY, UNSPECIFIED CLASSIFICATION, UNSPECIFIED OBESITY TYPE, UNSPECIFIED WHETHER SERIOUS COMORBIDITY PRESENT: ICD-10-CM

## 2021-05-07 NOTE — PROGRESS NOTES
wounds stage 2 wounds improved but still apparent on R lateral L inferior buttock. Heart: regular rate and rhythm. No murmurs. S1, S2 normal, no gallops. Lungs: clear to auscultation bilaterally. Abdomen:soft, nontender, nondistended, no masses or organomegaly. Extremities: no cyanosis or edema. Peripheral Pulses: 2+ throughout, symetric. Neurologic: verbally responsive, moves extremities. Psych: normal affect, speech fluent. ASSESSMENT:   Diagnosis Orders   1. Transverse myelitis ([address]     2. Type 2 diabetes mellitus without complication, with long-term current use of insulin ([facility])     3. Obesity, unspecified classification, unspecified obesity type, unspecified whether serious comorbidity present     4. Obesity due to excess calories with serious comorbidity, unspecified classification     5. Muscle spasm     6. Pressure injury, stage 3, unspecified location ([address]     7. Pressure injury of contiguous region involving buttock and hip, stage 2, unspecified laterality ([address]     8. Pressure injury of buttock, stage 2, unspecified laterality ([address]       PLAN:  She has improved function in hands and strength in leg. Sacral wound drainage copious amounts of fecal matter, anaerobic fecal smell, she had no fever. She is not able to sense BM, although can sense when she has one, which is qd. Given apparent rectal cutaneous drainage she was sent to hospital for surgical eval.     I, Dr. Connie Ramirez, personally performed the services described in this documentation as scribed by BRANDI Galeano in my presence, and is both accurate and complete.

## 2025-01-08 ENCOUNTER — HOSPITAL ENCOUNTER (OUTPATIENT)
Dept: PHYSICAL THERAPY | Age: 70
Setting detail: THERAPIES SERIES
Discharge: HOME | End: 2025-01-08
Payer: MEDICARE

## 2025-01-08 PROCEDURE — 97110 THERAPEUTIC EXERCISES: CPT

## 2025-01-08 PROCEDURE — 97161 PT EVAL LOW COMPLEX 20 MIN: CPT

## 2025-01-08 NOTE — PROGRESS NOTES
tissue  Hair Growth: Absent  Conditions to nail beds: Dystrophic left, Dystrophic right  Edema Rebound: Fibrotic Tissue    Signs of Constriction (if applicable):   Skin Breakdown: Yes  Skin Breakdown Location: buttocks  Papilloma (benign tumor arising from an epithelial layer): No  Fibrotic Areas: Yes  Lymphorrhea: No  Warts: No  Ulcers: No    Stage of Lymphedema: Stage 2: Spontaneously Irreversible Stage  Description:      Lymphedema Classification:   Type: Secondary Lymphedema  Left: Moderate     Right: Moderate    Measurements: Area Measured: R LE, L LE      Right Measurements Left Measurements   R LE Pre Girth Measurement (cm)  5th Tuberosity (cm): 26  Lateral malleolus: 28  Calf (cm): 47  Mid Knee (cm): 47.5  Thigh (cm): 61  Total Girth (cm): 209.5 L LE Pre Girth Measurement (cm)  5th Tuberosity (cm): 26  Lateral malleolus: 29  Calf (cm): 48  Mid Knee (cm): 47.5  Thigh (cm): 61  Total Girth (cm): 211.5         Functional Outcomes  Lymphedema Life Impact Scale (LLIS) - Version 2  Physical Concerns   The amount of pain associated with my lymphedema is: 1  The amount of limb heaviness associated with my lymphedema is:: 3  The amount of skin tightness associated with my lymphedema is:: 3  The size of my swollen limb(s) seems:: 2  Lymphedema affects the movement of my swollen limb(s) is:: 2  The strength in my swollen limb(s) is:: 1  Psychosocial Concerns  Lymphedema affects my body image (how I think I look):: 2  Lymphedema affects my socializing with others:: 1  Lymphedema affects my intimate relations with spouse or partner (rate 0 if not applicable): 0  Lymphedema “gets me down” (ie. I have feelings of depression, frustration, or anger due to the lymphedema).: 2  I must rely on others for help due to my lymphedema.: 2  I know what to do to manage my lymphedema.: 1  Functional Concerns  Lymphedema affects my ability to perform self-care activities (ie. eating, dressing, hygiene).: 1  Lymphedema affects my ability

## 2025-01-08 NOTE — PLAN OF CARE
[facility]           Phone: [phone]             Outpatient Physical Therapy  Fax: [phone]                                           Date: 2025  Patient: Diane Manzano : 1955 CSN #: [id number]   Referring Physician: Carlo Hollingsworth MD      [x] Plan of Care   [] Updated Plan of Care    Dates of Service to Include: 2025 to 25    Diagnosis:  Lymphedema, I89.0     Rehab (Treatment) Diagnosis:  B LE lymphedema         Onset Date:       Attendance  Total # of Visits to Date: 1 No Show: 0 Canceled Appointment: 0    Assessment  Assessment: Pt presents with Lymphedema (I89.0) of B LE's and hyperkeratosis of skin and fibrotic areas and hx of multiple occurrences of B LE cellulitis. Pt has initiated daily elevation, exercise, and the use of 20-30mmHg compression. Patient would benefit from physical therapy to decrease B LE girth, decrease risk of future infection, and be educated and equipped to become safe and independent with her lymphedema management.      Goals  Short Term Goals  Time Frame for Short Term Goals: 3 weeks  Short Term Goal 1: Patient to be instructed in her lymphedema management and POC.  Short Term Goal 2: Patient to initiate pump protocol to decrease B LE edema and girth.  Short Term Goal 3: Patient to tolerate seated/reclined B LE ther ex to improve functional mobility.  Long Term Goals  Time Frame for Long Term Goals : 6 weeks  Long Term Goal 1: Patient to be safe and independent with her lymphedema management.  Long Term Goal 2: Patient to demonstrate a 1-2cm decrease in B LE girth for improved mobility and prevention of infections.  Long Term Goal 3: Patient to be fit for at home compression pumps in order to be safe and independent with her lymphedema management.  Long Term Goal 4: Patient to have improved B LE strength >/=3+/5 grossly for improve functional strength.

## 2025-01-16 ENCOUNTER — HOSPITAL ENCOUNTER (OUTPATIENT)
Dept: PHYSICAL THERAPY | Age: 70
Setting detail: THERAPIES SERIES
Discharge: HOME | End: 2025-01-16
Payer: MEDICARE

## 2025-01-16 PROCEDURE — 97016 VASOPNEUMATIC DEVICE THERAPY: CPT

## 2025-01-16 PROCEDURE — 97110 THERAPEUTIC EXERCISES: CPT

## 2025-01-16 PROCEDURE — 97140 MANUAL THERAPY 1/> REGIONS: CPT

## 2025-01-16 NOTE — PROGRESS NOTES
Phone: [phone]                       [facility]          Fax: [phone]                      Outpatient Physical Therapy                                                             Lymphedema Treatment  Date: 2025  Patient: Diane Manzano  : 1955  CSN #: [id number]  Referring Physician: Referring Provider (secondary): Carlo Hollingsworth MD    Diagnosis: Lymphedema, I89.0    Treatment Diagnosis: B LE lymphedema  PT Insurance Information: [place] [employer]  Total # of Visits Approved: 12   Total # of Visits to Date: 2  No Show: 0  Canceled Appointment: 0     Subjective  Subjective: Pt. reports her legs feel about the same. No concerns or complaints since last session.  Additional Pertinent Hx: Transverse myelitis, DM, HTN, incomplete paraplegia, HLD    Skin Integumentary:   Skin Integrity: Intact  Pitting Scale Area 1: 1+  Skin Color: Appropriate for ethnicity  Skin Texture: Hyperkeratosis  Skin Condition/Temp: Dry, Flaky, Cool  Turgor: Epidermis thin w/ loss of subcut tissue  Hair Growth: Absent  Conditions to nail beds: Dystrophic left, Dystrophic right  Edema Rebound: Fibrotic Tissue    Signs of Constriction (if applicable):   Skin Breakdown: Yes  Skin Breakdown Location: buttocks  Papilloma (benign tumor arising from an epithelial layer): No  Fibrotic Areas: Yes  Lymphorrhea: No  Warts: No  Ulcers: No    Stage of Lymphedema: Stage 2: Spontaneously Irreversible Stage  Description:     Lymphedema Classification:   Type: Secondary Lymphedema  Left: Moderate     Right: Moderate    Exercises:  Exercise 1: HEP: low sodium diet, compression, elevation  Exercise 2: Seated BLE therex x15 ea    Manual:  Soft Tissue Mobilizaton: MLD to BLE's to improve lymphatic flow  Skin Integumentary  Skin Color: Appropriate for ethnicity  Skin Condition/Temp: Dry, Flaky, Cool  Skin Integrity: Intact  Turgor: Epidermis thin w/ loss of subcut tissue  Hair Growth: Absent  Conditions to nail beds: Dystrophic left, Dystrophic

## 2025-01-17 ENCOUNTER — HOSPITAL ENCOUNTER (OUTPATIENT)
Dept: PHYSICAL THERAPY | Age: 70
Setting detail: THERAPIES SERIES
Discharge: HOME | End: 2025-01-17
Payer: MEDICARE

## 2025-01-17 PROCEDURE — 97110 THERAPEUTIC EXERCISES: CPT

## 2025-01-17 PROCEDURE — 97140 MANUAL THERAPY 1/> REGIONS: CPT

## 2025-01-17 PROCEDURE — 97016 VASOPNEUMATIC DEVICE THERAPY: CPT

## 2025-01-17 NOTE — PROGRESS NOTES
Phone: [phone]                       [facility]          Fax: [phone]                      Outpatient Physical Therapy                                                             Lymphedema Treatment  Date: 2025  Patient: Diane Manazno  : 1955  CSN #: [id number]  Referring Physician: Referring Provider (secondary): Carlo Hollingsworth MD    Diagnosis: Lymphedema, I89.0    Treatment Diagnosis: B LE lymphedema  PT Insurance Information: [place] [employer]  Total # of Visits Approved: 12   Total # of Visits to Date: 3     Subjective  Subjective: Patient with no complaints from last session. Her R toe is bandages today.  Additional Pertinent Hx: Transverse myelitis, DM, HTN, incomplete paraplegia, HLD    Lymph Assessment    Skin Integumentary:   Skin Integrity: Intact  Pitting Scale Area 1: 1+  Skin Texture: Hyperkeratosis  Skin Condition/Temp: Dry, Flaky, Cool  Turgor: Epidermis thin w/ loss of subcut tissue  Hair Growth: Absent  Conditions to nail beds: Dystrophic left, Dystrophic right  Edema Rebound: Fibrotic Tissue    Signs of Constriction (if applicable):   Skin Breakdown: Yes  Skin Breakdown Location: buttocks  Papilloma (benign tumor arising from an epithelial layer): No  Fibrotic Areas: Yes  Lymphorrhea: No  Warts: No  Ulcers: No    Stage of Lymphedema: Stage 2: Spontaneously Irreversible Stage  Description:      Lymphedema Classification:   Type: Secondary Lymphedema  Left: Moderate     Right: Moderate    Measurements:    Right Measurements Left Measurements   R LE Pre Girth Measurement (cm)  5th Tuberosity (cm): 25.8  Lateral malleolus: 27.5  Calf (cm): 46.7  Mid Knee (cm): 47.3  Thigh (cm): 60.8  Total Girth (cm): 208.1 L LE Pre Girth Measurement (cm)  5th Tuberosity (cm): 25.8  Lateral malleolus: 28.6  Calf (cm): 47.6  Mid Knee (cm): 47.4  Thigh (cm): 60.7  Total Girth (cm): 210.1       Exercises:  Exercise 1: HEP: low sodium diet, compression, elevation  Exercise 2: Seated BLE therex x15

## 2025-01-22 ENCOUNTER — HOSPITAL ENCOUNTER (OUTPATIENT)
Dept: PHYSICAL THERAPY | Age: 70
Setting detail: THERAPIES SERIES
Discharge: HOME | End: 2025-01-22
Payer: MEDICARE

## 2025-01-22 PROCEDURE — 97110 THERAPEUTIC EXERCISES: CPT

## 2025-01-22 PROCEDURE — 97016 VASOPNEUMATIC DEVICE THERAPY: CPT

## 2025-01-22 PROCEDURE — 97140 MANUAL THERAPY 1/> REGIONS: CPT

## 2025-01-22 NOTE — PROGRESS NOTES
Phone: [phone]                       [facility]          Fax: [phone]                      Outpatient Physical Therapy                                                             Lymphedema Treatment  Date: 2025  Patient: Diane Manzano  : 1955  CSN #: [id number]  Referring Physician: Referring Provider (secondary): Carlo Hollingsworth MD    Diagnosis: Lymphedema, I89.0    Treatment Diagnosis: B LE lymphedema  PT Insurance Information: [place] [employer]  Total # of Visits Approved: 12   Total # of Visits to Date: 4  No Show: 0  Canceled Appointment: 0     Subjective  Subjective: Patient reports she is feeling a difference in the heaviness of her legs and it is getting easier to move them. She goes Friday to have her R great toe looked at.  Additional Pertinent Hx: Transverse myelitis, DM, HTN, incomplete paraplegia, HLD    Lymph Assessment    Skin Integumentary:   Skin Integrity: Intact  Pitting Scale Area 1: 1+  Skin Color: Appropriate for ethnicity  Skin Texture: Hyperkeratosis  Skin Condition/Temp: Dry, Flaky, Cool  Turgor: Epidermis thin w/ loss of subcut tissue  Hair Growth: Absent  Conditions to nail beds: Dystrophic left, Dystrophic right  Edema Rebound: Fibrotic Tissue    Signs of Constriction (if applicable):   Skin Breakdown: Yes  Skin Breakdown Location: buttocks  Papilloma (benign tumor arising from an epithelial layer): No  Fibrotic Areas: Yes  Lymphorrhea: No  Warts: No  Ulcers: No    Stage of Lymphedema: Stage 2: Spontaneously Irreversible Stage  Description:      Lymphedema Classification:   Type: Secondary Lymphedema  Left: Moderate     Right: Moderate    Measurements:    Right Measurements Left Measurements   R LE Pre Girth Measurement (cm)  5th Tuberosity (cm): 26.2  Lateral malleolus: 26.8  Calf (cm): 45.5  Mid Knee (cm): 48.5  Thigh (cm): 59.5  Total Girth (cm): 206.5 L LE Pre Girth Measurement (cm)  5th Tuberosity (cm): 26  Lateral malleolus: 28.1  Calf (cm): 47.6  Mid Knee (cm):

## 2025-01-23 ENCOUNTER — HOSPITAL ENCOUNTER (OUTPATIENT)
Dept: PHYSICAL THERAPY | Age: 70
Setting detail: THERAPIES SERIES
End: 2025-01-23
Payer: MEDICARE

## 2025-01-27 ENCOUNTER — HOSPITAL ENCOUNTER (OUTPATIENT)
Dept: PHYSICAL THERAPY | Age: 70
Setting detail: THERAPIES SERIES
Discharge: HOME | End: 2025-01-27
Payer: MEDICARE

## 2025-01-27 PROCEDURE — 97016 VASOPNEUMATIC DEVICE THERAPY: CPT

## 2025-01-27 PROCEDURE — 97110 THERAPEUTIC EXERCISES: CPT

## 2025-01-27 PROCEDURE — 97140 MANUAL THERAPY 1/> REGIONS: CPT

## 2025-01-27 NOTE — PROGRESS NOTES
Phone: [phone]                       [facility]          Fax: [phone]                      Outpatient Physical Therapy                                                             Lymphedema Treatment  Date: 2025  Patient: Diane Manzano  : 1955  CSN #: [id number]  Referring Physician: Referring Provider (secondary): Carlo Hollingsworth MD    Diagnosis: Lymphedema, I89.0    Treatment Diagnosis: B LE lymphedema  PT Insurance Information: [place] [employer]  Total # of Visits Approved: 12   Total # of Visits to Date: 5  No Show: 0  Canceled Appointment: 0     Subjective  Subjective: Pt. stated that her legs are a little more sensitive. Pt. with R great toe nail removed, per doctor she can continue with treatments at this time. No concerns or complaints since last session.  Additional Pertinent Hx: Transverse myelitis, DM, HTN, incomplete paraplegia, HLD    Skin Integumentary:   Skin Integrity: Intact  Pitting Scale Area 1: 1+  Skin Color: Appropriate for ethnicity  Skin Texture: Hyperkeratosis  Skin Condition/Temp: Dry, Flaky, Cool  Turgor: Epidermis thin w/ loss of subcut tissue  Hair Growth: Absent  Conditions to nail beds: Dystrophic left, Dystrophic right  Edema Rebound: Fibrotic Tissue    Signs of Constriction (if applicable):   Skin Breakdown: Yes  Skin Breakdown Location: buttocks  Papilloma (benign tumor arising from an epithelial layer): No  Fibrotic Areas: Yes  Lymphorrhea: No  Warts: No  Ulcers: No    Stage of Lymphedema: Stage 2: Spontaneously Irreversible Stage  Description:     Lymphedema Classification:   Type: Secondary Lymphedema  Left: Moderate     Right: Moderate    Exercises:  Exercise 1: HEP: low sodium diet, compression, elevation  Exercise 2: Seated BLE therex x20 ea    Manual:  Soft Tissue Mobilizaton: MLD to BLE's to improve lymphatic flow  Skin Integumentary  Skin Color: Appropriate for ethnicity  Skin Condition/Temp: Dry, Flaky, Cool  Skin Integrity: Intact  Turgor: Epidermis

## 2025-01-31 ENCOUNTER — HOSPITAL ENCOUNTER (OUTPATIENT)
Dept: PHYSICAL THERAPY | Age: 70
Setting detail: THERAPIES SERIES
Discharge: HOME | End: 2025-01-31
Payer: MEDICARE

## 2025-01-31 PROCEDURE — 97140 MANUAL THERAPY 1/> REGIONS: CPT

## 2025-01-31 PROCEDURE — 97016 VASOPNEUMATIC DEVICE THERAPY: CPT

## 2025-01-31 PROCEDURE — 97110 THERAPEUTIC EXERCISES: CPT

## 2025-01-31 NOTE — PROGRESS NOTES
Phone: [phone]                       [facility]          Fax: [phone]                      Outpatient Physical Therapy                                                             Lymphedema Treatment  Date: 2025  Patient: Diane Manzano  : 1955  CSN #: [id number]  Referring Physician: Referring Provider (secondary): Carlo Hollingsworth MD    Diagnosis: Lymphedema, I89.0    Treatment Diagnosis: B LE lymphedema  PT Insurance Information: [place] [employer]  Total # of Visits Approved: 12   Total # of Visits to Date: 6  No Show: 0  Canceled Appointment: 0     Subjective  Subjective: Pt. stated no concerns or complaints at this time.  Additional Pertinent Hx: Transverse myelitis, DM, HTN, incomplete paraplegia, HLD    Skin Integumentary:   Skin Integrity: Intact  Pitting Scale Area 1: 1+  Skin Color: Appropriate for ethnicity  Skin Texture: Hyperkeratosis  Skin Condition/Temp: Dry, Flaky, Cool  Turgor: Epidermis thin w/ loss of subcut tissue  Hair Growth: Absent  Conditions to nail beds: Dystrophic left, Dystrophic right  Edema Rebound: Fibrotic Tissue    Signs of Constriction (if applicable):   Skin Breakdown: Yes  Skin Breakdown Location: buttocks  Papilloma (benign tumor arising from an epithelial layer): No  Fibrotic Areas: Yes  Lymphorrhea: No  Warts: No  Ulcers: No    Stage of Lymphedema: Stage 2: Spontaneously Irreversible Stage  Description:     Lymphedema Classification:   Type: Secondary Lymphedema  Left: Moderate     Right: Moderate    Measurements: Area Measured: R LE, L LE      Right Measurements Left Measurements   R LE Pre Girth Measurement (cm)  5th Tuberosity (cm): 25.5  Lateral malleolus: 27  Calf (cm): 41.5  Mid Knee (cm): 51  Thigh (cm): 59  Total Girth (cm): 204 L LE Pre Girth Measurement (cm)  5th Tuberosity (cm): 25.5  Lateral malleolus: 31  Calf (cm): 44.7  Mid Knee (cm): 48.8  Thigh (cm): 56.6  Total Girth (cm): 206.6       Exercises:  Exercise 1: HEP: low sodium diet, compression,

## 2025-02-03 ENCOUNTER — HOSPITAL ENCOUNTER (OUTPATIENT)
Dept: PHYSICAL THERAPY | Age: 70
Setting detail: THERAPIES SERIES
Discharge: HOME | End: 2025-02-03
Payer: MEDICARE

## 2025-02-03 PROCEDURE — 97110 THERAPEUTIC EXERCISES: CPT

## 2025-02-03 PROCEDURE — 97016 VASOPNEUMATIC DEVICE THERAPY: CPT

## 2025-02-03 PROCEDURE — 97140 MANUAL THERAPY 1/> REGIONS: CPT

## 2025-02-03 NOTE — PROGRESS NOTES
Phone: [phone]                       [facility]          Fax: [phone]                      Outpatient Physical Therapy                                                             Lymphedema Treatment  Date: 2/3/2025  Patient: Diane Manzano  : 1955  CSN #: [id number]  Referring Physician: Referring Provider (secondary): Carlo Hollingsworth MD    Diagnosis: Lymphedema, I89.0    Treatment Diagnosis: B LE lymphedema  PT Insurance Information: [place] [employer]  Total # of Visits Approved: 12   Total # of Visits to Date: 7  No Show: 0  Canceled Appointment: 0     Subjective  Subjective: Pt. reports stiffness and \"tingling\" sensation this date. Has done a lot of running today. Reports she will be starting her therapy again here soon.  Additional Pertinent Hx: Transverse myelitis, DM, HTN, incomplete paraplegia, HLD    Skin Integumentary:   Skin Integrity: Intact  Pitting Scale Area 1: 1+  Skin Color: Appropriate for ethnicity  Skin Texture: Hyperkeratosis  Skin Condition/Temp: Dry, Flaky, Cool  Turgor: Epidermis thin w/ loss of subcut tissue  Hair Growth: Absent  Conditions to nail beds: Dystrophic left, Dystrophic right  Edema Rebound: Fibrotic Tissue    Signs of Constriction (if applicable):   Skin Breakdown: Yes  Skin Breakdown Location: buttocks  Papilloma (benign tumor arising from an epithelial layer): No  Fibrotic Areas: Yes  Lymphorrhea: No  Warts: No  Ulcers: No    Stage of Lymphedema: Stage 2: Spontaneously Irreversible Stage  Description:     Lymphedema Classification:   Type: Secondary Lymphedema  Left: Moderate     Right: Moderate    Exercises:  Exercise 1: HEP: low sodium diet, compression, elevation  Exercise 2: Seated BLE therex x20 ea. OTB x15 of HS curls    Manual:  Soft Tissue Mobilizaton: MLD to BLE's to improve lymphatic flow  Skin Integumentary  Skin Color: Appropriate for ethnicity  Skin Condition/Temp: Dry, Flaky, Cool  Skin Integrity: Intact  Turgor: Epidermis thin w/ loss of subcut

## 2025-02-07 ENCOUNTER — HOSPITAL ENCOUNTER (OUTPATIENT)
Dept: PHYSICAL THERAPY | Age: 70
Setting detail: THERAPIES SERIES
Discharge: HOME | End: 2025-02-07
Payer: MEDICARE

## 2025-02-07 PROCEDURE — 97110 THERAPEUTIC EXERCISES: CPT

## 2025-02-07 PROCEDURE — 97016 VASOPNEUMATIC DEVICE THERAPY: CPT

## 2025-02-07 PROCEDURE — 97140 MANUAL THERAPY 1/> REGIONS: CPT

## 2025-02-07 NOTE — PROGRESS NOTES
Phone: [phone]                       [facility]          Fax: [phone]                      Outpatient Physical Therapy                                                             Lymphedema Treatment  Date: 2025  Patient: Diane Manzano  : 1955  CSN #: [id number]  Referring Physician: Referring Provider (secondary): Carlo Hollingsworth MD    Diagnosis: Lymphedema, I89.0    Treatment Diagnosis: B LE lymphedema  PT Insurance Information: [place] [employer]  Total # of Visits Approved: 12   Total # of Visits to Date: 8  No Show: 0  Canceled Appointment: 0     Subjective  Subjective: Patient reports she did receive her loaner pump and for now it only has one sleeve so she will have to do one leg at a time. Pt to begin her other inpatient rehab soon.  Additional Pertinent Hx: Transverse myelitis, DM, HTN, incomplete paraplegia, HLD    Lymph Assessment    Skin Integumentary:   Pitting Scale Area 1: 1+  Skin Color: Appropriate for ethnicity  Skin Texture: Hyperkeratosis  Skin Condition/Temp: Dry, Flaky, Cool  Turgor: Epidermis thin w/ loss of subcut tissue  Hair Growth: Absent  Conditions to nail beds: Dystrophic left, Dystrophic right  Edema Rebound: Fibrotic Tissue    Signs of Constriction (if applicable):   Skin Breakdown: Yes  Skin Breakdown Location: buttocks  Papilloma (benign tumor arising from an epithelial layer): No  Fibrotic Areas: Yes  Lymphorrhea: No  Warts: No  Ulcers: No    Stage of Lymphedema: Stage 2: Spontaneously Irreversible Stage  Description:      Lymphedema Classification:   Type: Secondary Lymphedema  Left: Moderate     Right: Moderate    Exercises:  Exercise 1: HEP: low sodium diet, compression, elevation  Exercise 2: Seated BLE therex x20 ea. OTB x15 of HS curls    Manual:  Soft Tissue Mobilizaton: MLD to BLE's to improve lymphatic flow     Skin Integumentary  Skin Color: Appropriate for ethnicity  Skin Condition/Temp: Dry, Flaky, Cool  Turgor: Epidermis thin w/ loss of subcut

## 2025-02-13 ENCOUNTER — APPOINTMENT (OUTPATIENT)
Dept: PHYSICAL THERAPY | Age: 70
End: 2025-02-13
Payer: MEDICARE